# Patient Record
Sex: FEMALE | Race: WHITE | Employment: FULL TIME | URBAN - METROPOLITAN AREA
[De-identification: names, ages, dates, MRNs, and addresses within clinical notes are randomized per-mention and may not be internally consistent; named-entity substitution may affect disease eponyms.]

---

## 2018-05-01 LAB
HBV SURFACE AG SERPL QL IA: NON REACTIVE
HIV 1+2 AB+HIV1 P24 AG SERPL QL IA: NON REACTIVE
RUBELLA ABY IGG: NORMAL

## 2018-06-12 ENCOUNTER — OFFICE VISIT (OUTPATIENT)
Dept: FAMILY MEDICINE | Facility: CLINIC | Age: 28
End: 2018-06-12
Payer: COMMERCIAL

## 2018-06-12 VITALS
WEIGHT: 124.7 LBS | RESPIRATION RATE: 18 BRPM | BODY MASS INDEX: 19.57 KG/M2 | SYSTOLIC BLOOD PRESSURE: 100 MMHG | HEIGHT: 67 IN | DIASTOLIC BLOOD PRESSURE: 60 MMHG | OXYGEN SATURATION: 99 % | HEART RATE: 86 BPM | TEMPERATURE: 97.4 F

## 2018-06-12 DIAGNOSIS — J01.90 ACUTE RHINOSINUSITIS: Primary | ICD-10-CM

## 2018-06-12 DIAGNOSIS — Z34.90 PREGNANCY, UNSPECIFIED GESTATIONAL AGE: ICD-10-CM

## 2018-06-12 PROCEDURE — 99203 OFFICE O/P NEW LOW 30 MIN: CPT | Performed by: PHYSICIAN ASSISTANT

## 2018-06-12 NOTE — PROGRESS NOTES
SUBJECTIVE:   Krys Patino is a 28 year old female who presents to clinic today for the following health issues:    RESPIRATORY SYMPTOMS    Duration: 2.5 weeks     Description  nasal congestion, facial pain/pressure and ear pain left    Severity: moderate    Accompanying signs and symptoms: Yellow/green mucus production, tingling nose, shortness of breath - may be related to pregnancy      History (predisposing factors):  none    Precipitating or alleviating factors: None    Therapies tried and outcome: Tylenol, not effective     -Patient is a 29yo female in 2nd trimester of pregnancy with new 2+ week onset of sinus symptoms  -began initially with allergy symptoms  -las felt extra plugged on the left side  -difficulty to breathe in the left nostril, keeping open mouth  -left ear pain  -there is no sore throat  -mornings are the worst initially; now carrying through the day    Problem list and histories reviewed & adjusted, as indicated.  Additional history: as documented    Patient Active Problem List   Diagnosis     Iliotibial band syndrome     Gluteal tendinitis     Pain in joint, pelvic region and thigh     CARDIOVASCULAR SCREENING; LDL GOAL LESS THAN 160     Headache     Migraine without aura     Indication for care in labor or delivery      (spontaneous vaginal delivery)     Past Surgical History:   Procedure Laterality Date     CHOLECYSTECTOMY       gall stones         Social History   Substance Use Topics     Smoking status: Never Smoker     Smokeless tobacco: Never Used     Alcohol use 0.5 oz/week     1 Standard drinks or equivalent per week      Comment: 1 drink per week.     Family History   Problem Relation Age of Onset     Alcohol/Drug Father            Reviewed and updated as needed this visit by clinical staff       Reviewed and updated as needed this visit by Provider         ROS:  Constitutional, HEENT, cardiovascular, pulmonary, gi and gu systems are negative, except as otherwise  "noted.    OBJECTIVE:     /60 (BP Location: Right arm, Patient Position: Chair, Cuff Size: Adult Regular)  Pulse 86  Temp 97.4  F (36.3  C) (Tympanic)  Resp 18  Ht 5' 7\" (1.702 m)  Wt 124 lb 11.2 oz (56.6 kg)  SpO2 99%  BMI 19.53 kg/m2  Body mass index is 19.53 kg/(m^2).  GENERAL: healthy, alert and no distress  EYES: Eyes grossly normal to inspection, PERRL and conjunctivae and sclerae normal  HENT: normal cephalic/atraumatic, right ear: normal: no effusions, no erythema, normal landmarks, left ear: clear effusion and bulging membrane, nasal mucosa edematous , rhinorrhea yellow, oropharynx clear and sinuses: maxillary tenderness on left  NECK: bilateral anterior cervical adenopathy  CV: regular rates and rhythm, normal S1 S2, no S3 or S4, no murmur, click or rub, no peripheral edema      Diagnostic Test Results:  none     ASSESSMENT/PLAN:   1. Acute rhinosinusitis  2. Pregnancy, unspecified gestational age  Reviewed with patient today. This is quite a lenghty hx with continued exacerbation. She was very symptomatic on exam. Reviewed watchful waiting though patient has elected to treat today. Start below. OTC discussed for supportive cares. Follow up if not improving  - amoxicillin-clavulanate (AUGMENTIN) 875-125 MG per tablet; Take 1 tablet by mouth 2 times daily  Dispense: 20 tablet; Refill: 0    Claude Hutton PA-C  [facility]  "

## 2018-06-12 NOTE — MR AVS SNAPSHOT
After Visit Summary   6/12/2018    Krys Patino    MRN: [id number]           Patient Information     Date Of Birth          1990        Visit Information        Provider Department      6/12/2018 10:00 AM Claude Hutton PA-C [facility]        Today's Diagnoses     Acute rhinosinusitis    -  1    Pregnancy, unspecified gestational age           Follow-ups after your visit        Follow-up notes from your care team     Return in about 1 week (around 6/19/2018) for recheck if symptoms are not improving..      Who to contact     If you have questions or need follow up information about today's clinic visit or your schedule please contact [facility] directly at [phone].  Normal or non-critical lab and imaging results will be communicated to you by Latio[employer], letter or phone within 4 business days after the clinic has received the results. If you do not hear from us within 7 days, please contact the clinic through Latiohart or phone. If you have a critical or abnormal lab result, we will notify you by phone as soon as possible.  Submit refill requests through [employer] or call your pharmacy and they will forward the refill request to us. Please allow 3 business days for your refill to be completed.          Additional Information About Your Visit        MyChart Information     [employer] gives you secure access to your electronic health record. If you see a primary care provider, you can also send messages to your care team and make appointments. If you have questions, please call your primary care clinic.  If you do not have a primary care provider, please call [phone] and they will assist you.        Care EveryWhere ID     This is your Care EveryWhere ID. This could be used by other organizations to access your [facility] medical records  [id number]        Your Vitals Were     Pulse Temperature Respirations Height Pulse Oximetry BMI (Body Mass Index)     "86 97.4  F (36.3  C) (Tympanic) 18 5' 7\" (1.702 m) 99% 19.53 kg/m2       Blood Pressure from Last 3 Encounters:   06/12/18 100/60   12/06/16 128/84   11/11/14 118/74    Weight from Last 3 Encounters:   06/12/18 124 lb 11.2 oz (56.6 kg)   11/11/14 119 lb (54 kg)   04/08/13 123 lb (55.8 kg)              Today, you had the following     No orders found for display         Today's Medication Changes          These changes are accurate as of 6/12/18 10:23 AM.  If you have any questions, ask your nurse or doctor.               Start taking these medicines.        Dose/Directions    amoxicillin-clavulanate 875-125 MG per tablet   Commonly known as:  AUGMENTIN   Used for:  Acute rhinosinusitis   Started by:  Claude Hutton PA-C        Dose:  1 tablet   Take 1 tablet by mouth 2 times daily   Quantity:  20 tablet   Refills:  0            Where to get your medicines      [facility] [address] [place] [address] [address] [place] [address] & [facility]  [address] [facility] [address]     Phone:  [phone]     amoxicillin-clavulanate 875-125 MG per tablet                Primary Care Provider Office Phone # Fax #    Blossom Starks [phone]       [facility] [address] [facility] [address] [place] [address]        Equal Access to Services     [facility] Hospital[facility]ENICE AH: Hadii aad ku hadasho Soomaali, waaxda luqadaha, qaybta kaalmada adeegyada, darrell gibbons. So [facility] [phone].    ATENCIÓN: Si kathyla joyce, tiene a dennison disposición servicios gratuitos de asistencia lingüística. Llame al [phone].    We comply with applicable federal civil rights laws and [place] laws. We do not discriminate on the basis of race, color, national origin, age, disability, sex, sexual orientation, or gender identity.            Thank you!     Thank you for choosing [facility]  for your " care. Our goal is always to provide you with excellent care. Hearing back from our patients is one way we can continue to improve our services. Please take a few minutes to complete the written survey that you may receive in the mail after your visit with us. Thank you!             Your Updated Medication List - Protect others around you: Learn how to safely use, store and throw away your medicines at www.disposemymeds.org.          This list is accurate as of 6/12/18 10:23 AM.  Always use your most recent med list.                   Brand Name Dispense Instructions for use Diagnosis    amoxicillin-clavulanate 875-125 MG per tablet    AUGMENTIN    20 tablet    Take 1 tablet by mouth 2 times daily    Acute rhinosinusitis       prenatal multivitamin plus iron 27-0.8 MG Tabs per tablet      Take 1 tablet by mouth daily        SUMAtriptan 25 MG tablet    IMITREX    9 tablet    Take 1-2 tablets (25-50 mg) by mouth at onset of headache for migraine May repeat dose in 2 hours.  Do not exceed 200 mg in 24 hours    Migraine without aura, without mention of intractable migraine without mention of status migrainosus

## 2018-06-18 ENCOUNTER — HEALTH MAINTENANCE LETTER (OUTPATIENT)
Age: 28
End: 2018-06-18

## 2018-08-27 ENCOUNTER — TELEPHONE (OUTPATIENT)
Dept: FAMILY MEDICINE | Facility: CLINIC | Age: 28
End: 2018-08-27

## 2018-08-27 NOTE — TELEPHONE ENCOUNTER
Panel Management Review      Patient has the following on her problem list: None      Composite cancer screening  Chart review shows that this patient is due/due soon for the following Pap Smear  Summary:    Patient is due/failing the following:   PAP and PHYSICAL    Action needed:   Patient needs office visit for physical and pap smear.    Type of outreach:    Sent [employer] message.    Questions for provider review:    None                                                                                                                                    Марина Bowie MA

## 2018-11-08 LAB — GROUP B STREP PCR: NEGATIVE

## 2018-12-07 ENCOUNTER — HOSPITAL ENCOUNTER (INPATIENT)
Facility: CLINIC | Age: 28
LOS: 2 days | Discharge: HOME | End: 2018-12-09
Attending: OBSTETRICS & GYNECOLOGY | Admitting: OBSTETRICS & GYNECOLOGY
Payer: COMMERCIAL

## 2018-12-07 ENCOUNTER — ANESTHESIA (OUTPATIENT)
Dept: OBGYN | Facility: CLINIC | Age: 28
End: 2018-12-07
Payer: COMMERCIAL

## 2018-12-07 ENCOUNTER — ANESTHESIA EVENT (OUTPATIENT)
Dept: OBGYN | Facility: CLINIC | Age: 28
End: 2018-12-07
Payer: COMMERCIAL

## 2018-12-07 LAB
ABO + RH BLD: NORMAL
ABO + RH BLD: NORMAL
SPECIMEN EXP DATE BLD: NORMAL
T PALLIDUM AB SER QL: NONREACTIVE

## 2018-12-07 PROCEDURE — 25000132 ZZH RX MED GY IP 250 OP 250 PS 637: Performed by: OBSTETRICS & GYNECOLOGY

## 2018-12-07 PROCEDURE — 12000029 ZZH R&B OB INTERMEDIATE

## 2018-12-07 PROCEDURE — 86780 TREPONEMA PALLIDUM: CPT | Performed by: OBSTETRICS & GYNECOLOGY

## 2018-12-07 PROCEDURE — 86901 BLOOD TYPING SEROLOGIC RH(D): CPT | Performed by: OBSTETRICS & GYNECOLOGY

## 2018-12-07 PROCEDURE — 86900 BLOOD TYPING SEROLOGIC ABO: CPT | Performed by: OBSTETRICS & GYNECOLOGY

## 2018-12-07 PROCEDURE — 3E0R3BZ INTRODUCTION OF ANESTHETIC AGENT INTO SPINAL CANAL, PERCUTANEOUS APPROACH: ICD-10-PCS | Performed by: ANESTHESIOLOGY

## 2018-12-07 PROCEDURE — 25000128 H RX IP 250 OP 636: Performed by: OBSTETRICS & GYNECOLOGY

## 2018-12-07 PROCEDURE — 72200001 ZZH LABOR CARE VAGINAL DELIVERY SINGLE

## 2018-12-07 PROCEDURE — 37000011 ZZH ANESTHESIA WARD SERVICE

## 2018-12-07 PROCEDURE — 25000125 ZZHC RX 250: Performed by: OBSTETRICS & GYNECOLOGY

## 2018-12-07 PROCEDURE — 10907ZC DRAINAGE OF AMNIOTIC FLUID, THERAPEUTIC FROM PRODUCTS OF CONCEPTION, VIA NATURAL OR ARTIFICIAL OPENING: ICD-10-PCS | Performed by: OBSTETRICS & GYNECOLOGY

## 2018-12-07 PROCEDURE — 27110038 ZZH RX 271

## 2018-12-07 PROCEDURE — 25000128 H RX IP 250 OP 636

## 2018-12-07 PROCEDURE — G0463 HOSPITAL OUTPT CLINIC VISIT: HCPCS

## 2018-12-07 PROCEDURE — 25000128 H RX IP 250 OP 636: Performed by: ANESTHESIOLOGY

## 2018-12-07 PROCEDURE — 40000671 ZZH STATISTIC ANESTHESIA CASE

## 2018-12-07 PROCEDURE — 00HU33Z INSERTION OF INFUSION DEVICE INTO SPINAL CANAL, PERCUTANEOUS APPROACH: ICD-10-PCS | Performed by: ANESTHESIOLOGY

## 2018-12-07 RX ORDER — OXYCODONE AND ACETAMINOPHEN 5; 325 MG/1; MG/1
1 TABLET ORAL
Status: DISCONTINUED | OUTPATIENT
Start: 2018-12-07 | End: 2018-12-09 | Stop reason: CLARIF

## 2018-12-07 RX ORDER — OXYTOCIN/0.9 % SODIUM CHLORIDE 30/500 ML
100-340 PLASTIC BAG, INJECTION (ML) INTRAVENOUS CONTINUOUS PRN
Status: COMPLETED | OUTPATIENT
Start: 2018-12-07 | End: 2018-12-07

## 2018-12-07 RX ORDER — SODIUM CHLORIDE, SODIUM LACTATE, POTASSIUM CHLORIDE, CALCIUM CHLORIDE 600; 310; 30; 20 MG/100ML; MG/100ML; MG/100ML; MG/100ML
INJECTION, SOLUTION INTRAVENOUS CONTINUOUS
Status: DISCONTINUED | OUTPATIENT
Start: 2018-12-07 | End: 2018-12-09 | Stop reason: CLARIF

## 2018-12-07 RX ORDER — BUPIVACAINE HCL/0.9 % NACL/PF 0.125 %
15 PLASTIC BAG, INJECTION (ML) EPIDURAL CONTINUOUS
Status: DISCONTINUED | OUTPATIENT
Start: 2018-12-07 | End: 2018-12-09 | Stop reason: CLARIF

## 2018-12-07 RX ORDER — OXYTOCIN/0.9 % SODIUM CHLORIDE 30/500 ML
1-24 PLASTIC BAG, INJECTION (ML) INTRAVENOUS CONTINUOUS
Status: DISCONTINUED | OUTPATIENT
Start: 2018-12-07 | End: 2018-12-09 | Stop reason: CLARIF

## 2018-12-07 RX ORDER — ACETAMINOPHEN 325 MG/1
650 TABLET ORAL EVERY 4 HOURS PRN
Status: DISCONTINUED | OUTPATIENT
Start: 2018-12-07 | End: 2018-12-09 | Stop reason: CLARIF

## 2018-12-07 RX ORDER — EPHEDRINE SULFATE 50 MG/ML
INJECTION, SOLUTION INTRAMUSCULAR; INTRAVENOUS; SUBCUTANEOUS
Status: DISCONTINUED
Start: 2018-12-07 | End: 2018-12-07 | Stop reason: HOSPADM

## 2018-12-07 RX ORDER — OXYTOCIN/0.9 % SODIUM CHLORIDE 30/500 ML
100 PLASTIC BAG, INJECTION (ML) INTRAVENOUS CONTINUOUS
Status: DISCONTINUED | OUTPATIENT
Start: 2018-12-07 | End: 2018-12-09 | Stop reason: HOSPADM

## 2018-12-07 RX ORDER — LANOLIN 100 %
OINTMENT (GRAM) TOPICAL
Status: DISCONTINUED | OUTPATIENT
Start: 2018-12-07 | End: 2018-12-09 | Stop reason: HOSPADM

## 2018-12-07 RX ORDER — IBUPROFEN 600 MG/1
600 TABLET, FILM COATED ORAL EVERY 6 HOURS PRN
Status: DISCONTINUED | OUTPATIENT
Start: 2018-12-07 | End: 2018-12-09 | Stop reason: HOSPADM

## 2018-12-07 RX ORDER — METHYLERGONOVINE MALEATE 0.2 MG/ML
200 INJECTION INTRAVENOUS
Status: DISCONTINUED | OUTPATIENT
Start: 2018-12-07 | End: 2018-12-07

## 2018-12-07 RX ORDER — METHYLERGONOVINE MALEATE 0.2 MG/ML
200 INJECTION INTRAVENOUS
Status: DISCONTINUED | OUTPATIENT
Start: 2018-12-07 | End: 2018-12-09 | Stop reason: CLARIF

## 2018-12-07 RX ORDER — NALOXONE HYDROCHLORIDE 0.4 MG/ML
.1-.4 INJECTION, SOLUTION INTRAMUSCULAR; INTRAVENOUS; SUBCUTANEOUS
Status: DISCONTINUED | OUTPATIENT
Start: 2018-12-07 | End: 2018-12-07

## 2018-12-07 RX ORDER — OXYTOCIN 10 [USP'U]/ML
10 INJECTION, SOLUTION INTRAMUSCULAR; INTRAVENOUS
Status: DISCONTINUED | OUTPATIENT
Start: 2018-12-07 | End: 2018-12-07

## 2018-12-07 RX ORDER — NALOXONE HYDROCHLORIDE 0.4 MG/ML
.1-.4 INJECTION, SOLUTION INTRAMUSCULAR; INTRAVENOUS; SUBCUTANEOUS
Status: DISCONTINUED | OUTPATIENT
Start: 2018-12-07 | End: 2018-12-09 | Stop reason: CLARIF

## 2018-12-07 RX ORDER — BISACODYL 10 MG
10 SUPPOSITORY, RECTAL RECTAL DAILY PRN
Status: DISCONTINUED | OUTPATIENT
Start: 2018-12-09 | End: 2018-12-09 | Stop reason: HOSPADM

## 2018-12-07 RX ORDER — BUPIVACAINE HYDROCHLORIDE 2.5 MG/ML
INJECTION, SOLUTION EPIDURAL; INFILTRATION; INTRACAUDAL PRN
Status: DISCONTINUED | OUTPATIENT
Start: 2018-12-07 | End: 2018-12-07

## 2018-12-07 RX ORDER — OXYTOCIN/0.9 % SODIUM CHLORIDE 30/500 ML
340 PLASTIC BAG, INJECTION (ML) INTRAVENOUS CONTINUOUS PRN
Status: DISCONTINUED | OUTPATIENT
Start: 2018-12-07 | End: 2018-12-09 | Stop reason: HOSPADM

## 2018-12-07 RX ORDER — TERBUTALINE SULFATE 1 MG/ML
0.25 INJECTION, SOLUTION SUBCUTANEOUS
Status: DISCONTINUED | OUTPATIENT
Start: 2018-12-07 | End: 2018-12-09 | Stop reason: CLARIF

## 2018-12-07 RX ORDER — IBUPROFEN 800 MG/1
800 TABLET, FILM COATED ORAL
Status: DISCONTINUED | OUTPATIENT
Start: 2018-12-07 | End: 2018-12-09 | Stop reason: CLARIF

## 2018-12-07 RX ORDER — MISOPROSTOL 200 UG/1
800 TABLET ORAL
Status: DISCONTINUED | OUTPATIENT
Start: 2018-12-07 | End: 2018-12-09 | Stop reason: HOSPADM

## 2018-12-07 RX ORDER — OXYTOCIN 10 [USP'U]/ML
10 INJECTION, SOLUTION INTRAMUSCULAR; INTRAVENOUS
Status: DISCONTINUED | OUTPATIENT
Start: 2018-12-07 | End: 2018-12-09 | Stop reason: CLARIF

## 2018-12-07 RX ORDER — FENTANYL CITRATE 50 UG/ML
50-100 INJECTION, SOLUTION INTRAMUSCULAR; INTRAVENOUS
Status: DISCONTINUED | OUTPATIENT
Start: 2018-12-07 | End: 2018-12-09 | Stop reason: CLARIF

## 2018-12-07 RX ORDER — CARBOPROST TROMETHAMINE 250 UG/ML
250 INJECTION, SOLUTION INTRAMUSCULAR
Status: DISCONTINUED | OUTPATIENT
Start: 2018-12-07 | End: 2018-12-09 | Stop reason: CLARIF

## 2018-12-07 RX ORDER — ONDANSETRON 2 MG/ML
4 INJECTION INTRAMUSCULAR; INTRAVENOUS EVERY 6 HOURS PRN
Status: DISCONTINUED | OUTPATIENT
Start: 2018-12-07 | End: 2018-12-09 | Stop reason: CLARIF

## 2018-12-07 RX ORDER — AMOXICILLIN 250 MG
1 CAPSULE ORAL 2 TIMES DAILY
Status: DISCONTINUED | OUTPATIENT
Start: 2018-12-07 | End: 2018-12-09 | Stop reason: HOSPADM

## 2018-12-07 RX ORDER — AMOXICILLIN 250 MG
2 CAPSULE ORAL 2 TIMES DAILY
Status: DISCONTINUED | OUTPATIENT
Start: 2018-12-07 | End: 2018-12-09 | Stop reason: HOSPADM

## 2018-12-07 RX ORDER — ACETAMINOPHEN 325 MG/1
650 TABLET ORAL EVERY 4 HOURS PRN
Status: DISCONTINUED | OUTPATIENT
Start: 2018-12-07 | End: 2018-12-07

## 2018-12-07 RX ORDER — EPHEDRINE SULFATE 50 MG/ML
5 INJECTION, SOLUTION INTRAMUSCULAR; INTRAVENOUS; SUBCUTANEOUS
Status: DISCONTINUED | OUTPATIENT
Start: 2018-12-07 | End: 2018-12-09 | Stop reason: CLARIF

## 2018-12-07 RX ORDER — HYDROCORTISONE 2.5 %
CREAM (GRAM) TOPICAL 3 TIMES DAILY PRN
Status: DISCONTINUED | OUTPATIENT
Start: 2018-12-07 | End: 2018-12-09 | Stop reason: HOSPADM

## 2018-12-07 RX ORDER — BUPIVACAINE HCL/0.9 % NACL/PF 0.125 %
PLASTIC BAG, INJECTION (ML) EPIDURAL
Status: COMPLETED
Start: 2018-12-07 | End: 2018-12-07

## 2018-12-07 RX ADMIN — IBUPROFEN 600 MG: 600 TABLET ORAL at 11:30

## 2018-12-07 RX ADMIN — BUPIVACAINE HYDROCHLORIDE 10 ML: 2.5 INJECTION, SOLUTION EPIDURAL; INFILTRATION; INTRACAUDAL at 05:42

## 2018-12-07 RX ADMIN — Medication 15 ML/HR: at 05:45

## 2018-12-07 RX ADMIN — FENTANYL CITRATE 100 MCG: 50 INJECTION, SOLUTION INTRAMUSCULAR; INTRAVENOUS at 06:52

## 2018-12-07 RX ADMIN — SODIUM CHLORIDE, POTASSIUM CHLORIDE, SODIUM LACTATE AND CALCIUM CHLORIDE: 600; 310; 30; 20 INJECTION, SOLUTION INTRAVENOUS at 05:01

## 2018-12-07 RX ADMIN — IBUPROFEN 600 MG: 600 TABLET ORAL at 17:13

## 2018-12-07 RX ADMIN — SENNOSIDES AND DOCUSATE SODIUM 1 TABLET: 8.6; 5 TABLET ORAL at 13:11

## 2018-12-07 RX ADMIN — SODIUM CHLORIDE, POTASSIUM CHLORIDE, SODIUM LACTATE AND CALCIUM CHLORIDE: 600; 310; 30; 20 INJECTION, SOLUTION INTRAVENOUS at 06:05

## 2018-12-07 RX ADMIN — SENNOSIDES AND DOCUSATE SODIUM 1 TABLET: 8.6; 5 TABLET ORAL at 19:54

## 2018-12-07 RX ADMIN — OXYTOCIN-SODIUM CHLORIDE 0.9% IV SOLN 30 UNIT/500ML 340 ML/HR: 30-0.9/5 SOLUTION at 10:18

## 2018-12-07 NOTE — PROGRESS NOTES
Public Health Nurse (PHN) met with patient, discussed resources within [facility].  Provided family resources of [facility] Public Health services resource card, home visiting card, community resource guide and car seat information card given and discussed.  Offered referral for home visiting, family declined. Patient declined any questions or concerns.

## 2018-12-07 NOTE — PROVIDER NOTIFICATION
12/07/18 0130   Provider Notification   Provider Name/Title Dr. Andrews    Method of Notification Phone   Request Evaluate - Remote   Notification Reason Patient Arrived;SVE;Uterine Activity;Labor Status   MD notified of patient arrival. Patient was scheduled for induction today at 0400. SVE 2/70/-2. CTX pattern every 2-5 minutes. Cat 1 FHR tracing. Patient reports ctx feeling like period cramps. Denies leaking of fluid. Intrapartum orders received. Patient okay to walk with intermittent monitoring. Plan to start pitocin augmentation between 5727-1818 if patient has not made cervical change. POC discussed with patient and support person.

## 2018-12-07 NOTE — L&D DELIVERY NOTE
Krys Patino is a 28 year old  female,  2 para 1 with EDC 18, who was admitted for spontaneous labor at 39 weeks gestation.    Her prenatal care was with Dr. Starks. Prenatal course was uncomplicated. Vaginal Group B Streptococcus culture was negative.  The estimated fetal weight was 7#10oz.  AROM completed at 45cm dilation, clear fluid.,  Patient Did receive an epidural for pain relief.  The patient achieved complete dilation at 1004. She went on to deliver a vigorous male infant at 1014 by . Apgars were  8 at one minute and 9 at five minutes. There was no nuchal cord, but a true knot was noted in the umbilical cord. The placenta delivered spontaneously and intact, with a 3VC.  The patient had an intact perineum.   QBL for the delivery was 500cc.  Blossom Starks MD

## 2018-12-07 NOTE — PROVIDER NOTIFICATION
12/07/18 0735   Provider Notification   Provider Name/Title Dr. Starks   Method of Notification At Bedside     Discussed POC, SVE 5/80/-1, AROM of sm amt of clear fluid.  Plan to recheck in 2 hrs, possibly begin pitocin at that time if indicated.

## 2018-12-07 NOTE — H&P
No significant change in general health status based on examination of the patient, review of Nursing Admission Database and prenatal record.  admitted at 39.2 for active labor. GBS negative.     EFW: 7#10oz    Blossom Starks MD

## 2018-12-07 NOTE — PROVIDER NOTIFICATION
12/07/18 0530   Provider Notification   Provider Name/Title MDA   Method of Notification At Bedside   Request Evaluate in Person   Notification Reason Pain   Dr. Barboza at bedside to place epidural.

## 2018-12-07 NOTE — PLAN OF CARE
Data: Patient presented to [facility]: 2018 12:22 AM.  Reason for maternal/fetal assessment is uterine contractions. Patient reports contractions beginning this evening, occurring every 2-5 minutes..  Patient is a .  Prenatal record reviewed. Pregnancy  has been complicated by has been uncomplicated.  Gestational Age 39w2d. VSS. Fetal movement present. Patient denies leaking of vaginal fluid/rupture of membranes, vaginal bleeding, pelvic pressure, nausea, vomiting, headache. Support person is present.   Action: Verbal consent for EFM. Triage assessment completed. Bill of rights reviewed.  Response: Patient verbalized agreement with plan. Will contact Dr Alfredo Andrews with update and further orders.

## 2018-12-07 NOTE — PROVIDER NOTIFICATION
12/07/18 0941   Provider Notification   Provider Name/Title Dr. Starks   Method of Notification Phone;Electronic Page     Updated of SVE ant lip/100/0, pt denies any pressure or pushy. DR stated to monitor 1 hr then recheck sve.  T's cat 1 tracing. Contrx q2-4.

## 2018-12-07 NOTE — PROGRESS NOTES
Pt seen at bedside.  Has epidural in place.  Now C/O increased pain.  Level is at T10 only on left side.  Pt given 8ml of 0.5% bupiv + 100mcg fent as re-bolus.  No complications.  Got good relief.  Start time:0644  End time:0656    Cheo Maher MD

## 2018-12-07 NOTE — ANESTHESIA PROCEDURE NOTES
Peripheral nerve/Neuraxial procedure note : epidural catheter  Pre-Procedure  Performed by AG ALMEIDA  Referred by SHADE  Location: OB    Procedure Times:12/7/2018 5:27 AM and 12/7/2018 5:43 AM  Pre-Anesthestic Checklist: patient identified, IV checked, risks and benefits discussed, informed consent, monitors and equipment checked, pre-op evaluation and at physician/surgeon's request    Timeout  Correct Patient: Yes   Correct Procedure: Yes   Correct Site: Yes   Correct Laterality: N/A   Correct Position: Yes   Site Marked: N/A   .   Procedure Documentation    .    Procedure:    Epidural catheter.  Insertion Site:L3-4  (midline approach) Injection technique: LORT saline   Local skin infiltrated with mL of 1% lidocaine.  AYANA at 5 cm     Patient Prep;mask, sterile gloves, povidone-iodine 7.5% surgical scrub, patient draped.  .  Needle: Touhy needle Needle Gauge: 17.    Needle Length (Inches) 3.5  # of attempts: 1 and # of redirects:  .   Catheter: 19 G . .  Catheter threaded easily  6 cm epidural space.  11 cm at skin.   .    Assessment/Narrative  Paresthesias: No.  .  .  Aspiration negative for heme or CSF  . Test dose of 3 mL lidocaine 1.5% w/ 1:200,000 epinephrine at 05:38.  Test dose negative for signs of intravascular, subdural or intrathecal injection.

## 2018-12-07 NOTE — PLAN OF CARE
Data: Krys Patino transferred to [facility] via wheelchair at 1255. Baby transferred via parent's arms.  Action: Receiving unit notified of transfer: Yes. Patient and family notified of room change. Report given to Vidya at 1300. Belongings sent to receiving unit. Accompanied by Registered Nurse. Oriented patient to surroundings. Call light within reach. ID bands double-checked with receiving RN.  Response: Patient tolerated transfer and is stable.

## 2018-12-07 NOTE — PROVIDER NOTIFICATION
12/07/18 0627   Provider Notification   Provider Name/Title Dr. Starks   Method of Notification Phone   Request Evaluate - Remote   Notification Reason Status Update;SVE;Pain;Uterine Activity;Labor Status   Dr. Starks phoned department regarding patient status. SVE 4/80/-2. Membranes intact.  Bertha every 2-4 minutes. Received epidural at 0530. Plan for Dr. Starks to round at 0800 and perform AROM. POC reviewed with patient and significant other.

## 2018-12-07 NOTE — PLAN OF CARE
Problem: Patient Care Overview  Goal: Plan of Care/Patient Progress Review  Outcome: No Change  Pt brought to room 435 and bands checked with L&D nurse. Oriented to unit and room. Bladder scanned for 301cc and straight cathed by labor nurse for 400cc and due to void 1700. SBA at this point to get OOB due to epidural. Breastfeeding well. Ibu for pain and stool softener given. Will monitor.

## 2018-12-07 NOTE — PROVIDER NOTIFICATION
12/07/18 0655   Provider Notification   Provider Name/Title Dr. Maher    Method of Notification At Bedside   Request Evaluate in Person   Notification Reason Pain   MDA at bedside for epidural rebolus. Patient feeling pain in right side during contractions, did not resolve with initial epidural placement and position change.

## 2018-12-08 PROCEDURE — 40000083 ZZH STATISTIC IP LACTATION SERVICES 1-15 MIN

## 2018-12-08 PROCEDURE — 25000132 ZZH RX MED GY IP 250 OP 250 PS 637: Performed by: OBSTETRICS & GYNECOLOGY

## 2018-12-08 PROCEDURE — 12000029 ZZH R&B OB INTERMEDIATE

## 2018-12-08 RX ADMIN — IBUPROFEN 600 MG: 600 TABLET ORAL at 19:14

## 2018-12-08 RX ADMIN — IBUPROFEN 600 MG: 600 TABLET ORAL at 11:43

## 2018-12-08 RX ADMIN — SENNOSIDES AND DOCUSATE SODIUM 1 TABLET: 8.6; 5 TABLET ORAL at 07:55

## 2018-12-08 RX ADMIN — ACETAMINOPHEN 650 MG: 325 TABLET, FILM COATED ORAL at 07:55

## 2018-12-08 RX ADMIN — SENNOSIDES AND DOCUSATE SODIUM 1 TABLET: 8.6; 5 TABLET ORAL at 19:13

## 2018-12-08 RX ADMIN — IBUPROFEN 600 MG: 600 TABLET ORAL at 02:12

## 2018-12-08 ASSESSMENT — ACTIVITIES OF DAILY LIVING (ADL)
TRANSFERRING: 0-->INDEPENDENT
DRESS: 0-->INDEPENDENT
RETIRED_EATING: 0-->INDEPENDENT
RETIRED_COMMUNICATION: 0-->UNDERSTANDS/COMMUNICATES WITHOUT DIFFICULTY
BATHING: 0-->INDEPENDENT
TOILETING: 0-->INDEPENDENT
FALL_HISTORY_WITHIN_LAST_SIX_MONTHS: NO
COGNITION: 0 - NO COGNITION ISSUES REPORTED
AMBULATION: 0-->INDEPENDENT
SWALLOWING: 0-->SWALLOWS FOODS/LIQUIDS WITHOUT DIFFICULTY

## 2018-12-08 NOTE — PROGRESS NOTES
[facility]   Obstetrics Progress Note    Subjective: This is the patient's first day since Vaginal delivery. She is doing well.  She is urinating on her own. Pain is controlled with medication.    Objective:   All vitals stable  Temp: 98.7  F (37.1  C) Temp src: Oral BP: 119/74 Pulse: 56   Resp: 18 SpO2: 97 %        EXAM:  Constitutional: healthy, alert, no distress.   Abdomen: Abdomen soft, non-tender. BS normal. No masses, fundus is firm.  JOINT/EXTREMITIES: extremities normal     Last hemoglobin was No results found for: HGB]    Assessment: Stable postpartum course.    Plan: Routine care. Ambulation encouraged  Breast feeding strategies discussed  Pain control measures as needed  Reportable signs and symptoms dicussed with the patient  Anticipate discharge tomorrow    Moon Bowling

## 2018-12-08 NOTE — PLAN OF CARE
Problem: Patient Care Overview  Goal: Plan of Care/Patient Progress Review  Outcome: Improving  Pt stable and meeting expected goals. VSS. Up ad donnie and independent with cares. Pain managed with ibuprofen this shift. Breastfeeding is going well. Bonding well with infant. Continue to monitor.

## 2018-12-08 NOTE — ANESTHESIA POSTPROCEDURE EVALUATION
Patient: Krys Patino    * No procedures listed *    Diagnosis:* No pre-op diagnosis entered *  Diagnosis Additional Information: Labor pain    Anesthesia Type:  Epidural    Note:  Anesthesia Post Evaluation         Comments:     S/P epidural for labor.   I or my partner was immediately available for management of this patient during epidural analgesia infusion.  VSS.  Doing well. Block resolved.  Neuro at baseline. Denies positional headache. Minimal side effects easily managed w/ PRN meds. No apparent anesthetic complications. No follow-up required.    Cheo Maher MD          Last vitals:  Vitals:    12/07/18 1953 12/08/18 0212 12/08/18 0746   BP: 104/69 108/63 119/74   Pulse: 71 58 56   Resp: 18 16 18   Temp: 97.6  F (36.4  C)  98.7  F (37.1  C)   SpO2:            Electronically Signed By: Cheo Maher MD  December 8, 2018  4:03 PM

## 2018-12-08 NOTE — LACTATION NOTE
Lactation visit. Patient states breastfeeding has been going well, despite 's respiratory issues. He has been actively sucking/feeding at breast for 15-20 minutes on one side, and appears content after feedings. Reviewed normal course of lactation, 's second night, and encouraged her to call PRN with any questions or concerns. Patient noted none at this time.

## 2018-12-08 NOTE — PLAN OF CARE
Problem: Patient Care Overview  Goal: Plan of Care/Patient Progress Review  Outcome: Improving  Pt up and donnie. Voiding without difficulty. Breastfeeding on demand with minimal assistance. Tylenol and ibuprofen effective for pain management. Fundus firm and midline, clots noted by pt - educated pt on calling nurse and or when to seek medication attention upon discharge. Anticipate discharge tomorrow 12/9. Continue to monitor.    Allegra Huff

## 2018-12-08 NOTE — PLAN OF CARE
Problem: Patient Care Overview  Goal: Plan of Care/Patient Progress Review  Outcome: Improving  Patient stable this evening. Denies pain. Ibuprofen for comfort. Voided in large amount independently. Family present this evening. Breastfeeding or attempting frequently. No concerns at this time.

## 2018-12-09 VITALS
WEIGHT: 153 LBS | RESPIRATION RATE: 16 BRPM | HEIGHT: 67 IN | TEMPERATURE: 98.8 F | HEART RATE: 80 BPM | OXYGEN SATURATION: 97 % | DIASTOLIC BLOOD PRESSURE: 75 MMHG | SYSTOLIC BLOOD PRESSURE: 138 MMHG | BODY MASS INDEX: 24.01 KG/M2

## 2018-12-09 PROCEDURE — 25000132 ZZH RX MED GY IP 250 OP 250 PS 637: Performed by: OBSTETRICS & GYNECOLOGY

## 2018-12-09 RX ADMIN — SENNOSIDES AND DOCUSATE SODIUM 1 TABLET: 8.6; 5 TABLET ORAL at 10:41

## 2018-12-09 RX ADMIN — IBUPROFEN 600 MG: 600 TABLET ORAL at 10:41

## 2018-12-09 RX ADMIN — IBUPROFEN 600 MG: 600 TABLET ORAL at 03:45

## 2018-12-09 NOTE — DOWNTIME EVENT NOTE
The EMR was down for 5 hours 45 minutes on 12/9/2018.    Gayatri Wong RN was responsible for completing the paper charting during this time period.     The following information was re-entered into the system by Gayatri Wong: Flowsheet data and MAR    The following information will remain in the paper chart: flowsheet data    Gayatri Wong  12/9/2018

## 2018-12-09 NOTE — PLAN OF CARE
Problem: Patient Care Overview  Goal: Plan of Care/Patient Progress Review  Outcome: Improving  Meeting expected goals. Independent with self and infant care. Instructed mom to fill out birth certificate and depression scale.

## 2018-12-09 NOTE — DISCHARGE INSTRUCTIONS
[facility]: [phone]    Postpartum Vaginal Delivery Instructions    Activity       Ask family and friends for help when you need it.    Do not place anything in your vagina for 6 weeks.    You are not restricted on other activities, but take it easy for a few weeks to allow your body to recover from delivery.  You are able to do any activities you feel up to that point.    No driving until you have stopped taking your pain medications (usually two weeks after delivery).     Call your health care provider if you have any of these symptoms:       Increased pain, swelling, redness, or fluid around your stiches from an episiotomy or perineal tear.    A fever above 100.4 F (38 C) with or without chills when placing a thermometer under your tongue.    You soak a sanitary pad with blood within 1 hour, or you see blood clots larger than a golf ball.    Bleeding that lasts more than 6 weeks.    Vaginal discharge that smells bad.    Severe pain, cramping or tenderness in your lower belly area.    A need to urinate more frequently (use the toilet more often), more urgently (use the toilet very quickly), or it burns when you urinate.    Nausea and vomiting.    Redness, swelling or pain around a vein in your leg.    Problems breastfeeding or a red or painful area on your breast.    Chest pain and cough or are gasping for air.    Problems coping with sadness, anxiety, or depression.  If you have any concerns about hurting yourself or the baby, call your provider immediately.     You have questions or concerns after you return home.     Keep your hands clean:  Always wash your hands before touching your perineal area and stitches.  This helps reduce your risk of infection.  If your hands aren't dirty, you may use an alcohol hand-rub to clean your hands. Keep your nails clean and short.

## 2018-12-09 NOTE — PLAN OF CARE
Patient stable and discharged to bed and board with  at 1240. AVS reviewed and all questions answered. No medications needed. Pain well managed with PRN ibuprofen. Declined breast pump.

## 2018-12-09 NOTE — PLAN OF CARE
Pt stable and meeting expected goals. VSS. Up ad donnie and independent with cares. Pain managed with ibuprofen this shift. Breastfeeding is going well. Bonding well with infant. Continue to monitor.

## 2018-12-09 NOTE — PROGRESS NOTES
[facility]   Obstetrics Progress Note    Subjective: This is the patient's second day since Vaginal delivery. She is doing well.  She is urinating on her own. Pain is controlled with medication. Baby has pneumothorax.     Objective:   All vitals stable  Temp: 98.7  F (37.1  C) Temp src: Oral BP: 123/69 Pulse: 60              EXAM:  Constitutional: healthy, alert, no distress.   Abdomen: Abdomen soft, non-tender. BS normal. No masses, fundus is firm.  JOINT/EXTREMITIES: extremities normal     Last hemoglobin was No results found for: HGB]    Assessment: Stable postpartum course.    Plan: Routine care. Ambulation encouraged  Breast feeding strategies discussed  Pain control measures as needed  Reportable signs and symptoms dicussed with the patient  Anticipate discharge tomorrow    Moon Bowling

## 2018-12-10 ENCOUNTER — LACTATION ENCOUNTER (OUTPATIENT)
Age: 28
End: 2018-12-10

## 2018-12-10 NOTE — LACTATION NOTE
This note was copied from a baby's chart.  LC to see patient.  Baby was circumcised this morning, but was able to latch immediately after returning.  He has been nursing better since frenotomy per Krys's report.  Her milk is in and he is gaining weight.  LC recommended mouth exercises prior to latch to help with coordination of tongue.  No concerns noted.  Krys is aware that she may call prn.

## 2019-05-24 ENCOUNTER — HOSPITAL ENCOUNTER (OUTPATIENT)
Dept: ULTRASOUND IMAGING | Facility: CLINIC | Age: 29
Discharge: HOME | End: 2019-05-24
Attending: OBSTETRICS & GYNECOLOGY | Admitting: OBSTETRICS & GYNECOLOGY
Payer: COMMERCIAL

## 2019-05-24 DIAGNOSIS — N63.10 LUMP OF RIGHT BREAST: ICD-10-CM

## 2019-05-24 PROCEDURE — 76642 ULTRASOUND BREAST LIMITED: CPT | Mod: RT

## 2020-03-02 ENCOUNTER — HEALTH MAINTENANCE LETTER (OUTPATIENT)
Age: 30
End: 2020-03-02

## 2020-12-20 ENCOUNTER — HEALTH MAINTENANCE LETTER (OUTPATIENT)
Age: 30
End: 2020-12-20

## 2021-04-18 ENCOUNTER — HEALTH MAINTENANCE LETTER (OUTPATIENT)
Age: 31
End: 2021-04-18

## 2021-10-03 ENCOUNTER — HEALTH MAINTENANCE LETTER (OUTPATIENT)
Age: 31
End: 2021-10-03

## 2022-05-14 ENCOUNTER — HEALTH MAINTENANCE LETTER (OUTPATIENT)
Age: 32
End: 2022-05-14

## 2022-09-04 ENCOUNTER — HEALTH MAINTENANCE LETTER (OUTPATIENT)
Age: 32
End: 2022-09-04

## 2023-06-03 ENCOUNTER — HEALTH MAINTENANCE LETTER (OUTPATIENT)
Age: 33
End: 2023-06-03

## 2023-10-12 NOTE — NURSING NOTE
Medication: Allopurinol 100mg    Last office visit date: 9/18/23.  Visit pending 9/23/24.    Last uric acid & CMP 8/14/23    Medication Refill Protocol Failed stating medication is not on medication list.  Is on medication list.  Last Rx listed as historical 8/22/22- with instructions take 1 tablet daily.  Previous Rx 7/18/23 for #180 with 3 refills with instructions to take 2 tablets daily.    Left message for patient to call back to inquire if he is taking one pill daily or two pills daily.     Patient is unsure when last pap was, possibly around February 2017.